# Patient Record
Sex: FEMALE | Race: WHITE | NOT HISPANIC OR LATINO
[De-identification: names, ages, dates, MRNs, and addresses within clinical notes are randomized per-mention and may not be internally consistent; named-entity substitution may affect disease eponyms.]

---

## 2021-02-25 ENCOUNTER — TRANSCRIPTION ENCOUNTER (OUTPATIENT)
Age: 36
End: 2021-02-25

## 2022-09-19 ENCOUNTER — NON-APPOINTMENT (OUTPATIENT)
Age: 37
End: 2022-09-19

## 2023-01-03 ENCOUNTER — APPOINTMENT (OUTPATIENT)
Dept: MATERNAL FETAL MEDICINE | Facility: CLINIC | Age: 38
End: 2023-01-03

## 2023-01-03 ENCOUNTER — APPOINTMENT (OUTPATIENT)
Dept: ANTEPARTUM | Facility: CLINIC | Age: 38
End: 2023-01-03

## 2023-01-09 ENCOUNTER — NON-APPOINTMENT (OUTPATIENT)
Age: 38
End: 2023-01-09

## 2023-01-12 ENCOUNTER — ASOB RESULT (OUTPATIENT)
Age: 38
End: 2023-01-12

## 2023-01-12 ENCOUNTER — APPOINTMENT (OUTPATIENT)
Dept: ANTEPARTUM | Facility: CLINIC | Age: 38
End: 2023-01-12
Payer: COMMERCIAL

## 2023-01-12 ENCOUNTER — APPOINTMENT (OUTPATIENT)
Dept: MATERNAL FETAL MEDICINE | Facility: CLINIC | Age: 38
End: 2023-01-12

## 2023-01-12 PROCEDURE — 76801 OB US < 14 WKS SINGLE FETUS: CPT

## 2023-01-12 PROCEDURE — 76813 OB US NUCHAL MEAS 1 GEST: CPT | Mod: 59

## 2023-01-12 PROCEDURE — 99204 OFFICE O/P NEW MOD 45 MIN: CPT | Mod: 25

## 2023-02-07 ENCOUNTER — NON-APPOINTMENT (OUTPATIENT)
Age: 38
End: 2023-02-07

## 2023-02-28 ENCOUNTER — TRANSCRIPTION ENCOUNTER (OUTPATIENT)
Age: 38
End: 2023-02-28

## 2023-02-28 ENCOUNTER — NON-APPOINTMENT (OUTPATIENT)
Age: 38
End: 2023-02-28

## 2023-03-01 ENCOUNTER — ASOB RESULT (OUTPATIENT)
Age: 38
End: 2023-03-01

## 2023-03-01 ENCOUNTER — APPOINTMENT (OUTPATIENT)
Dept: ANTEPARTUM | Facility: CLINIC | Age: 38
End: 2023-03-01
Payer: COMMERCIAL

## 2023-03-01 PROCEDURE — 76811 OB US DETAILED SNGL FETUS: CPT

## 2023-03-02 ENCOUNTER — APPOINTMENT (OUTPATIENT)
Dept: OTOLARYNGOLOGY | Facility: CLINIC | Age: 38
End: 2023-03-02
Payer: COMMERCIAL

## 2023-03-02 ENCOUNTER — NON-APPOINTMENT (OUTPATIENT)
Age: 38
End: 2023-03-02

## 2023-03-02 VITALS
WEIGHT: 190 LBS | RESPIRATION RATE: 16 BRPM | SYSTOLIC BLOOD PRESSURE: 108 MMHG | OXYGEN SATURATION: 97 % | HEIGHT: 65 IN | HEART RATE: 79 BPM | TEMPERATURE: 98 F | DIASTOLIC BLOOD PRESSURE: 75 MMHG | BODY MASS INDEX: 31.65 KG/M2

## 2023-03-02 DIAGNOSIS — Z78.9 OTHER SPECIFIED HEALTH STATUS: ICD-10-CM

## 2023-03-02 PROCEDURE — 31231 NASAL ENDOSCOPY DX: CPT

## 2023-03-02 PROCEDURE — 92550 TYMPANOMETRY & REFLEX THRESH: CPT | Mod: 52

## 2023-03-02 PROCEDURE — 92557 COMPREHENSIVE HEARING TEST: CPT

## 2023-03-02 PROCEDURE — 99204 OFFICE O/P NEW MOD 45 MIN: CPT | Mod: 25

## 2023-03-02 NOTE — HISTORY OF PRESENT ILLNESS
[de-identified] : 38 y/o F is presenting with b aural fullness and l hl after plane flight for the past 2 weeks. She can clear her right ear, but cannot clear her left ear. She is also c/o nasal congestion and has clear nasal discharge. She has tried several nasal rinses and sleeping on a either side with minimal relief. She had uri when she flew. She denies allergies. She is currently 20 weeks pregnant. She has had a cough and her OB is treating her for this and listened to her lungs. She took a recent zpack but could not tolerate this because of GI upset. Nonsmoker. No fh relevant to cc.

## 2023-03-02 NOTE — DATA REVIEWED
[de-identified] :  showed r nl hearing, l mixed hl- ab gaps, b type c tympanograms (l could be B) -results reviewed with pt

## 2023-03-02 NOTE — REASON FOR VISIT
[Initial Evaluation] : an initial evaluation for [FreeTextEntry2] : b aural fullness l>r, nasal congestion

## 2023-03-02 NOTE — ASSESSMENT
[FreeTextEntry1] : l rashad, b etd after flying with uri while pregnant\par - showed r nl hearing, l mixed hl- ab gaps, b type c tympanograms -results reviewed with pt \par -scope showed b mucoid drainage l>r\par -advised to try to auto insufflate ear TID - she could not in office,and she had been trying before to do this\par -spoke with covering OB who recommended Flonase (over Afrin), zpack (as we explained she needs abx), and to take a probiotic with zpack (PCN allergic)\par -pt has upcoming flight 3/13 --> explained she may need to postpone flight of e.t. still blocked or could consider myringotomy at next visit if no improvement\par RTC in 1 week or sooner for any issues

## 2023-03-02 NOTE — PHYSICAL EXAM
[Midline] : trachea located in midline position [Nasal Endoscopy Performed] : nasal endoscopy was performed, see procedure section for findings [Normal] : no neck adenopathy [de-identified] : r nl- examined under microscope, l rashad- examined under microscope - norris-colored ME fluid [de-identified] : gait steady

## 2023-03-02 NOTE — PROCEDURE
[Anterior rhinoscopy insufficient to account for symptoms] : anterior rhinoscopy insufficient to account for symptoms [Topical Lidocaine] : topical lidocaine [Oxymetazoline HCl] : oxymetazoline HCl [Flexible Endoscope] : examined with the flexible endoscope [Normal] : the paranasal sinuses had no abnormalities [Posterior Lesion] : posterior lesion [Nasal Mucosa] : purulence on the right [Serial Number: ___] : Serial Number: [unfilled] [FreeTextEntry6] : done to r/o sinusitis, check OMU patency  and ro npx mass causing l unilat rashad\par found to have b mucoid drainage from eustachian tubes l>r \par r omu mucoid draiange [FreeTextEntry3] : ets wwith mucoid drainage

## 2023-03-08 ENCOUNTER — ASOB RESULT (OUTPATIENT)
Age: 38
End: 2023-03-08

## 2023-03-08 ENCOUNTER — APPOINTMENT (OUTPATIENT)
Dept: ANTEPARTUM | Facility: CLINIC | Age: 38
End: 2023-03-08
Payer: COMMERCIAL

## 2023-03-08 PROCEDURE — 76825 ECHO EXAM OF FETAL HEART: CPT

## 2023-03-08 PROCEDURE — 76827 ECHO EXAM OF FETAL HEART: CPT

## 2023-03-08 PROCEDURE — 93325 DOPPLER ECHO COLOR FLOW MAPG: CPT

## 2023-03-09 ENCOUNTER — APPOINTMENT (OUTPATIENT)
Dept: OTOLARYNGOLOGY | Facility: CLINIC | Age: 38
End: 2023-03-09
Payer: COMMERCIAL

## 2023-03-09 VITALS
HEIGHT: 65 IN | DIASTOLIC BLOOD PRESSURE: 78 MMHG | OXYGEN SATURATION: 98 % | BODY MASS INDEX: 32.49 KG/M2 | HEART RATE: 91 BPM | SYSTOLIC BLOOD PRESSURE: 116 MMHG | TEMPERATURE: 97.7 F | WEIGHT: 195 LBS

## 2023-03-09 DIAGNOSIS — H65.92 UNSPECIFIED NONSUPPURATIVE OTITIS MEDIA, LEFT EAR: ICD-10-CM

## 2023-03-09 DIAGNOSIS — H90.72 MIXED CONDUCTIVE AND SENSORINEURAL HEARING LOSS, UNILATERAL, LEFT EAR, WITH UNRESTRICTED HEARING ON THE CONTRALATERAL SIDE: ICD-10-CM

## 2023-03-09 DIAGNOSIS — H69.80 OTHER SPECIFIED DISORDERS OF EUSTACHIAN TUBE, UNSPECIFIED EAR: ICD-10-CM

## 2023-03-09 PROCEDURE — 99213 OFFICE O/P EST LOW 20 MIN: CPT

## 2023-03-09 NOTE — HISTORY OF PRESENT ILLNESS
[de-identified] : 1 week followup visit for this 38 y/o F with b aural fullness with uri after a plane flight approximately 4 weeks ago. She is currently 21 weeks pregnant. She took 1 zpack with OB. At last visit she was found to have l rashad, b etd, and l mixed hl. We spoke with covering OB who recommended Flonase and zpack. She feels nasal congestion, and fullness in ears is 85% improved. She can now easily autoinsufflate b ears.

## 2023-03-09 NOTE — ASSESSMENT
[FreeTextEntry1] : l rashad, b etd after flying with uri while pregnant\par -s/p zpack\par -improved\par -pt can insufflate both ears\par -reassured she should be able to fly\par -prior to flight recommended Afrin 30 minutes prior to takeoff but to discuss this with OB first, and [employer]\par RTC as needed

## 2023-03-09 NOTE — REASON FOR VISIT
[Subsequent Evaluation] : a subsequent evaluation for [FreeTextEntry2] : b aural fullness l>r, nasal congestion

## 2023-03-09 NOTE — PHYSICAL EXAM
[Midline] : trachea located in midline position [Normal] : no neck adenopathy [de-identified] : gait steady

## 2023-05-30 ENCOUNTER — APPOINTMENT (OUTPATIENT)
Dept: PULMONOLOGY | Facility: CLINIC | Age: 38
End: 2023-05-30
Payer: COMMERCIAL

## 2023-05-30 ENCOUNTER — RX RENEWAL (OUTPATIENT)
Age: 38
End: 2023-05-30

## 2023-05-30 VITALS
TEMPERATURE: 97 F | HEART RATE: 92 BPM | BODY MASS INDEX: 34.99 KG/M2 | DIASTOLIC BLOOD PRESSURE: 78 MMHG | WEIGHT: 210 LBS | OXYGEN SATURATION: 98 % | HEIGHT: 65 IN | RESPIRATION RATE: 15 BRPM | SYSTOLIC BLOOD PRESSURE: 111 MMHG

## 2023-05-30 DIAGNOSIS — R05.9 COUGH, UNSPECIFIED: ICD-10-CM

## 2023-05-30 PROCEDURE — 99203 OFFICE O/P NEW LOW 30 MIN: CPT

## 2023-05-30 NOTE — ASSESSMENT
[FreeTextEntry1] : Plan:\par \par -Flonase and Pecid for 2 days, monitor for improvement in cough\par -Albuterol ordered, use only if cough does not improve\par -Call to inform if cough has improved\par -Notify office immediately of any S/S of acute respiratory distress \par \par Attending:\par \par Agree with above.  37-year-old woman, 33 weeks pregnant.  Second pregnancy.  No pulmonary history.  No history of asthma.  Uncomplicated first pregnancy.  Non-smoker.  No history of allergies or atopy.  Patient sounds like he has had several viral infections over the past 12 months.  She had one in September before she was pregnant, fevers, cough, viral type symptoms.  Another episode in March, ultimately seen by ENT for the cough and treated with azithromycin.  Afrin was considered but her OB told her not to take it.  That resolved.  Now again for the past 3 weeks runny nose, sinus congestion and cough.  No shortness of breath.  Cough is bothersome.  She also has a history of reflux and GERD with pregnancy and has been taking Tums a lot.  Oxygen saturation is normal.  Her lungs are clear.  She coughs with any deep inspiration through the mouth, improves when read through her nose.  Cough is most consistent at this time with upper airway cough.  Perhaps being exacerbated by some reflux.  Trial of Flonase twice daily.  Pepcid.  If not improved in a couple days, please  the prescription for albuterol inhaler and let me know if that improves.  Call if any symptoms worsen or any shortness of breath develops right away.

## 2023-05-30 NOTE — HISTORY OF PRESENT ILLNESS
[Never] : never [TextBox_4] : Pt is a 36y/o F, 33 weeks pregnant, with history of GERD, who presents today with c/o productive cough for 3 weeks.\par \par Reports initial viral infection 8 months ago with presentation of cough, fever, SOB, and wheezing, symptoms got better after a few days, however cough remains for about 2 months. States she again had viral infection with cough in March, seen by ENT, who prescribed Azithromycin, symptoms got better, however cough again presented within the last 3 weeks. States cough tend to be worse in morning, when talking, or laughing. Does have a 3 year old son, who attends .\par \par Denies SOB, wheezing, fever, chest tightness, chest pressure, hoarseness, change in voice, or rhinorrhea. No history of childhood Asthma or frequent URI. Although she does report using an inhaler as a child, however has not been given a diagnosis of Asthma.  Thought she was experiencing seasonal allergies, has used Claritin and nasal spray daily with minimal relief.  Reports she uses Tums daily due to acid reflux. \par \par No cigarette use, no occupational or hazardous behavior. \par \par \par

## 2023-07-23 ENCOUNTER — INPATIENT (INPATIENT)
Facility: HOSPITAL | Age: 38
LOS: 1 days | Discharge: HOME | End: 2023-07-25
Attending: OBSTETRICS & GYNECOLOGY | Admitting: OBSTETRICS & GYNECOLOGY
Payer: COMMERCIAL

## 2023-07-23 ENCOUNTER — TRANSCRIPTION ENCOUNTER (OUTPATIENT)
Age: 38
End: 2023-07-23

## 2023-07-23 VITALS — SYSTOLIC BLOOD PRESSURE: 129 MMHG | HEART RATE: 94 BPM | DIASTOLIC BLOOD PRESSURE: 74 MMHG

## 2023-07-23 RX ORDER — SODIUM CHLORIDE 9 MG/ML
1000 INJECTION, SOLUTION INTRAVENOUS
Refills: 0 | Status: DISCONTINUED | OUTPATIENT
Start: 2023-07-23 | End: 2023-07-24

## 2023-07-23 RX ORDER — OXYTOCIN 10 UNIT/ML
333.33 VIAL (ML) INJECTION
Qty: 20 | Refills: 0 | Status: DISCONTINUED | OUTPATIENT
Start: 2023-07-23 | End: 2023-07-25

## 2023-07-23 RX ORDER — CITRIC ACID/SODIUM CITRATE 300-500 MG
15 SOLUTION, ORAL ORAL EVERY 6 HOURS
Refills: 0 | Status: DISCONTINUED | OUTPATIENT
Start: 2023-07-23 | End: 2023-07-24

## 2023-07-23 RX ORDER — CHLORHEXIDINE GLUCONATE 213 G/1000ML
1 SOLUTION TOPICAL DAILY
Refills: 0 | Status: DISCONTINUED | OUTPATIENT
Start: 2023-07-23 | End: 2023-07-24

## 2023-07-23 RX ADMIN — SODIUM CHLORIDE 125 MILLILITER(S): 9 INJECTION, SOLUTION INTRAVENOUS at 23:42

## 2023-07-23 NOTE — OB PROVIDER H&P - ASSESSMENT
A/P: 38yo  @ 40w3d here for elective IOL  - Admit to L&D  - Routine labs  - EFM/TOCO: resuscitative measures prn  - Buccal cytotec x 1  - Place cervical arvizu balloon at midnight  - Augment with Pitocin after 1 dose of buccal cytotec  - Anesthesia consult for epidural prn  - Expect     d/w , Tori Ontiveros PA-C

## 2023-07-23 NOTE — OB PROVIDER H&P - NSLOWPPHRISK_OBGYN_A_OB
No previous uterine incision/Michel Pregnancy/Less than or equal to 4 previous vaginal births/No known bleeding disorder/No history of postpartum hemorrhage/No other PPH risks indicated

## 2023-07-23 NOTE — OB RN PATIENT PROFILE - FALL HARM RISK - UNIVERSAL INTERVENTIONS
Bed in lowest position, wheels locked, appropriate side rails in place/Call bell, personal items and telephone in reach/Instruct patient to call for assistance before getting out of bed or chair/Non-slip footwear when patient is out of bed/[place] to call system/Physically safe environment - no spills, clutter or unnecessary equipment/Purposeful Proactive Rounding/Room/bathroom lighting operational, light cord in reach

## 2023-07-23 NOTE — OB PROVIDER H&P - NSHPPHYSICALEXAM_GEN_ALL_CORE
PE:    T(C): 36.5 (07-23-23 @ 22:59), Max: 36.5 (07-23-23 @ 22:46)  HR: 103 (07-23-23 @ 23:11) (89 - 103)  BP: 129/74 (07-23-23 @ 22:59) (129/74 - 129/74)  RR: 16 (07-23-23 @ 22:59) (16 - 16)  SpO2: 96% (07-23-23 @ 23:11) (93% - 99%)    General: NAD, A&Ox3  CV: RRR  Lungs: Clear bilat   Abd: soft, nontender, gravid  VE: 1/70/-3  Bedside sono: vertex  EFM: 135/moderate variablity/+accels/-decels  TOCO: none

## 2023-07-24 ENCOUNTER — TRANSCRIPTION ENCOUNTER (OUTPATIENT)
Age: 38
End: 2023-07-24

## 2023-07-24 LAB
ALBUMIN SERPL ELPH-MCNC: 3.3 G/DL — SIGNIFICANT CHANGE UP (ref 3.3–5)
ALP SERPL-CCNC: 119 U/L — SIGNIFICANT CHANGE UP (ref 40–120)
ALT FLD-CCNC: 8 U/L — LOW (ref 10–45)
ANION GAP SERPL CALC-SCNC: 12 MMOL/L — SIGNIFICANT CHANGE UP (ref 5–17)
APTT BLD: 24.6 SEC — LOW (ref 27.5–35.5)
AST SERPL-CCNC: 16 U/L — SIGNIFICANT CHANGE UP (ref 10–40)
BASOPHILS # BLD AUTO: 0 K/UL — SIGNIFICANT CHANGE UP (ref 0–0.2)
BASOPHILS # BLD AUTO: 0.05 K/UL — SIGNIFICANT CHANGE UP (ref 0–0.2)
BASOPHILS NFR BLD AUTO: 0 % — SIGNIFICANT CHANGE UP (ref 0–2)
BASOPHILS NFR BLD AUTO: 0.4 % — SIGNIFICANT CHANGE UP (ref 0–2)
BILIRUB SERPL-MCNC: 0.7 MG/DL — SIGNIFICANT CHANGE UP (ref 0.2–1.2)
BUN SERPL-MCNC: 6 MG/DL — LOW (ref 7–23)
CALCIUM SERPL-MCNC: 8.9 MG/DL — SIGNIFICANT CHANGE UP (ref 8.4–10.5)
CHLORIDE SERPL-SCNC: 103 MMOL/L — SIGNIFICANT CHANGE UP (ref 96–108)
CO2 SERPL-SCNC: 18 MMOL/L — LOW (ref 22–31)
COVID-19 SPIKE DOMAIN AB INTERP: POSITIVE
COVID-19 SPIKE DOMAIN ANTIBODY RESULT: >250 U/ML — HIGH
CREAT SERPL-MCNC: 0.52 MG/DL — SIGNIFICANT CHANGE UP (ref 0.5–1.3)
EGFR: 123 ML/MIN/1.73M2 — SIGNIFICANT CHANGE UP
EOSINOPHIL # BLD AUTO: 0.21 K/UL — SIGNIFICANT CHANGE UP (ref 0–0.5)
EOSINOPHIL # BLD AUTO: 0.31 K/UL — SIGNIFICANT CHANGE UP (ref 0–0.5)
EOSINOPHIL NFR BLD AUTO: 0.9 % — SIGNIFICANT CHANGE UP (ref 0–6)
EOSINOPHIL NFR BLD AUTO: 2.3 % — SIGNIFICANT CHANGE UP (ref 0–6)
FIBRINOGEN PPP-MCNC: 411 MG/DL — SIGNIFICANT CHANGE UP (ref 200–445)
GLUCOSE SERPL-MCNC: 89 MG/DL — SIGNIFICANT CHANGE UP (ref 70–99)
HCT VFR BLD CALC: 34.8 % — SIGNIFICANT CHANGE UP (ref 34.5–45)
HCT VFR BLD CALC: 35 % — SIGNIFICANT CHANGE UP (ref 34.5–45)
HGB BLD-MCNC: 11.9 G/DL — SIGNIFICANT CHANGE UP (ref 11.5–15.5)
HGB BLD-MCNC: 12 G/DL — SIGNIFICANT CHANGE UP (ref 11.5–15.5)
IMM GRANULOCYTES NFR BLD AUTO: 1.2 % — HIGH (ref 0–0.9)
INR BLD: 0.92 RATIO — SIGNIFICANT CHANGE UP (ref 0.88–1.16)
LDH SERPL L TO P-CCNC: 230 U/L — SIGNIFICANT CHANGE UP (ref 50–242)
LYMPHOCYTES # BLD AUTO: 1.8 K/UL — SIGNIFICANT CHANGE UP (ref 1–3.3)
LYMPHOCYTES # BLD AUTO: 28 % — SIGNIFICANT CHANGE UP (ref 13–44)
LYMPHOCYTES # BLD AUTO: 3.69 K/UL — HIGH (ref 1–3.3)
LYMPHOCYTES # BLD AUTO: 7.8 % — LOW (ref 13–44)
MANUAL SMEAR VERIFICATION: SIGNIFICANT CHANGE UP
MCHC RBC-ENTMCNC: 30.9 PG — SIGNIFICANT CHANGE UP (ref 27–34)
MCHC RBC-ENTMCNC: 30.9 PG — SIGNIFICANT CHANGE UP (ref 27–34)
MCHC RBC-ENTMCNC: 34.2 GM/DL — SIGNIFICANT CHANGE UP (ref 32–36)
MCHC RBC-ENTMCNC: 34.3 GM/DL — SIGNIFICANT CHANGE UP (ref 32–36)
MCV RBC AUTO: 90.2 FL — SIGNIFICANT CHANGE UP (ref 80–100)
MCV RBC AUTO: 90.4 FL — SIGNIFICANT CHANGE UP (ref 80–100)
MONOCYTES # BLD AUTO: 0.39 K/UL — SIGNIFICANT CHANGE UP (ref 0–0.9)
MONOCYTES # BLD AUTO: 0.89 K/UL — SIGNIFICANT CHANGE UP (ref 0–0.9)
MONOCYTES NFR BLD AUTO: 1.7 % — LOW (ref 2–14)
MONOCYTES NFR BLD AUTO: 6.7 % — SIGNIFICANT CHANGE UP (ref 2–14)
NEUTROPHILS # BLD AUTO: 20.64 K/UL — HIGH (ref 1.8–7.4)
NEUTROPHILS # BLD AUTO: 8.1 K/UL — HIGH (ref 1.8–7.4)
NEUTROPHILS NFR BLD AUTO: 61.4 % — SIGNIFICANT CHANGE UP (ref 43–77)
NEUTROPHILS NFR BLD AUTO: 87 % — HIGH (ref 43–77)
NEUTS BAND # BLD: 2.6 % — SIGNIFICANT CHANGE UP (ref 0–8)
NRBC # BLD: 0 /100 WBCS — SIGNIFICANT CHANGE UP (ref 0–0)
PLAT MORPH BLD: NORMAL — SIGNIFICANT CHANGE UP
PLATELET # BLD AUTO: 168 K/UL — SIGNIFICANT CHANGE UP (ref 150–400)
PLATELET # BLD AUTO: 211 K/UL — SIGNIFICANT CHANGE UP (ref 150–400)
POTASSIUM SERPL-MCNC: 3.8 MMOL/L — SIGNIFICANT CHANGE UP (ref 3.5–5.3)
POTASSIUM SERPL-SCNC: 3.8 MMOL/L — SIGNIFICANT CHANGE UP (ref 3.5–5.3)
PROT SERPL-MCNC: 5.4 G/DL — LOW (ref 6–8.3)
PROTHROM AB SERPL-ACNC: 10.6 SEC — SIGNIFICANT CHANGE UP (ref 10.5–13.4)
RBC # BLD: 3.85 M/UL — SIGNIFICANT CHANGE UP (ref 3.8–5.2)
RBC # BLD: 3.88 M/UL — SIGNIFICANT CHANGE UP (ref 3.8–5.2)
RBC # FLD: 13.2 % — SIGNIFICANT CHANGE UP (ref 10.3–14.5)
RBC # FLD: 13.2 % — SIGNIFICANT CHANGE UP (ref 10.3–14.5)
RBC BLD AUTO: SIGNIFICANT CHANGE UP
SARS-COV-2 IGG+IGM SERPL QL IA: >250 U/ML — HIGH
SARS-COV-2 IGG+IGM SERPL QL IA: POSITIVE
SODIUM SERPL-SCNC: 133 MMOL/L — LOW (ref 135–145)
T PALLIDUM AB TITR SER: NEGATIVE — SIGNIFICANT CHANGE UP
URATE SERPL-MCNC: 4.2 MG/DL — SIGNIFICANT CHANGE UP (ref 2.5–7)
WBC # BLD: 13.2 K/UL — HIGH (ref 3.8–10.5)
WBC # BLD: 23.04 K/UL — HIGH (ref 3.8–10.5)
WBC # FLD AUTO: 13.2 K/UL — HIGH (ref 3.8–10.5)
WBC # FLD AUTO: 23.04 K/UL — HIGH (ref 3.8–10.5)

## 2023-07-24 RX ORDER — OXYTOCIN 10 UNIT/ML
4 VIAL (ML) INJECTION
Qty: 30 | Refills: 0 | Status: DISCONTINUED | OUTPATIENT
Start: 2023-07-24 | End: 2023-07-24

## 2023-07-24 RX ORDER — HYDROCORTISONE 1 %
1 OINTMENT (GRAM) TOPICAL EVERY 6 HOURS
Refills: 0 | Status: DISCONTINUED | OUTPATIENT
Start: 2023-07-24 | End: 2023-07-25

## 2023-07-24 RX ORDER — BENZOCAINE 10 %
1 GEL (GRAM) MUCOUS MEMBRANE EVERY 6 HOURS
Refills: 0 | Status: DISCONTINUED | OUTPATIENT
Start: 2023-07-24 | End: 2023-07-25

## 2023-07-24 RX ORDER — KETOROLAC TROMETHAMINE 30 MG/ML
30 SYRINGE (ML) INJECTION ONCE
Refills: 0 | Status: DISCONTINUED | OUTPATIENT
Start: 2023-07-24 | End: 2023-07-24

## 2023-07-24 RX ORDER — DIPHENHYDRAMINE HCL 50 MG
25 CAPSULE ORAL EVERY 6 HOURS
Refills: 0 | Status: DISCONTINUED | OUTPATIENT
Start: 2023-07-24 | End: 2023-07-25

## 2023-07-24 RX ORDER — LANOLIN
1 OINTMENT (GRAM) TOPICAL EVERY 6 HOURS
Refills: 0 | Status: DISCONTINUED | OUTPATIENT
Start: 2023-07-24 | End: 2023-07-25

## 2023-07-24 RX ORDER — PRAMOXINE HYDROCHLORIDE 150 MG/15G
1 AEROSOL, FOAM RECTAL EVERY 4 HOURS
Refills: 0 | Status: DISCONTINUED | OUTPATIENT
Start: 2023-07-24 | End: 2023-07-25

## 2023-07-24 RX ORDER — TETANUS TOXOID, REDUCED DIPHTHERIA TOXOID AND ACELLULAR PERTUSSIS VACCINE, ADSORBED 5; 2.5; 8; 8; 2.5 [IU]/.5ML; [IU]/.5ML; UG/.5ML; UG/.5ML; UG/.5ML
0.5 SUSPENSION INTRAMUSCULAR ONCE
Refills: 0 | Status: DISCONTINUED | OUTPATIENT
Start: 2023-07-24 | End: 2023-07-25

## 2023-07-24 RX ORDER — SODIUM CHLORIDE 9 MG/ML
500 INJECTION, SOLUTION INTRAVENOUS ONCE
Refills: 0 | Status: COMPLETED | OUTPATIENT
Start: 2023-07-24 | End: 2023-07-24

## 2023-07-24 RX ORDER — SODIUM CHLORIDE 9 MG/ML
3 INJECTION INTRAMUSCULAR; INTRAVENOUS; SUBCUTANEOUS EVERY 8 HOURS
Refills: 0 | Status: DISCONTINUED | OUTPATIENT
Start: 2023-07-24 | End: 2023-07-25

## 2023-07-24 RX ORDER — OXYTOCIN 10 UNIT/ML
41.67 VIAL (ML) INJECTION
Qty: 20 | Refills: 0 | Status: DISCONTINUED | OUTPATIENT
Start: 2023-07-24 | End: 2023-07-25

## 2023-07-24 RX ORDER — DIBUCAINE 1 %
1 OINTMENT (GRAM) RECTAL EVERY 6 HOURS
Refills: 0 | Status: DISCONTINUED | OUTPATIENT
Start: 2023-07-24 | End: 2023-07-25

## 2023-07-24 RX ORDER — ACETAMINOPHEN 500 MG
975 TABLET ORAL
Refills: 0 | Status: DISCONTINUED | OUTPATIENT
Start: 2023-07-24 | End: 2023-07-25

## 2023-07-24 RX ORDER — SIMETHICONE 80 MG/1
80 TABLET, CHEWABLE ORAL EVERY 4 HOURS
Refills: 0 | Status: DISCONTINUED | OUTPATIENT
Start: 2023-07-24 | End: 2023-07-25

## 2023-07-24 RX ORDER — OXYCODONE HYDROCHLORIDE 5 MG/1
5 TABLET ORAL ONCE
Refills: 0 | Status: DISCONTINUED | OUTPATIENT
Start: 2023-07-24 | End: 2023-07-25

## 2023-07-24 RX ORDER — MAGNESIUM HYDROXIDE 400 MG/1
30 TABLET, CHEWABLE ORAL
Refills: 0 | Status: DISCONTINUED | OUTPATIENT
Start: 2023-07-24 | End: 2023-07-25

## 2023-07-24 RX ORDER — AER TRAVELER 0.5 G/1
1 SOLUTION RECTAL; TOPICAL EVERY 4 HOURS
Refills: 0 | Status: DISCONTINUED | OUTPATIENT
Start: 2023-07-24 | End: 2023-07-25

## 2023-07-24 RX ORDER — IBUPROFEN 200 MG
600 TABLET ORAL EVERY 6 HOURS
Refills: 0 | Status: COMPLETED | OUTPATIENT
Start: 2023-07-24 | End: 2024-06-21

## 2023-07-24 RX ORDER — OXYCODONE HYDROCHLORIDE 5 MG/1
5 TABLET ORAL
Refills: 0 | Status: DISCONTINUED | OUTPATIENT
Start: 2023-07-24 | End: 2023-07-25

## 2023-07-24 RX ORDER — IBUPROFEN 200 MG
600 TABLET ORAL EVERY 6 HOURS
Refills: 0 | Status: DISCONTINUED | OUTPATIENT
Start: 2023-07-24 | End: 2023-07-25

## 2023-07-24 RX ADMIN — Medication 125 MILLIUNIT(S)/MIN: at 15:24

## 2023-07-24 RX ADMIN — Medication 600 MILLIGRAM(S): at 21:00

## 2023-07-24 RX ADMIN — Medication 1 TABLET(S): at 21:00

## 2023-07-24 RX ADMIN — Medication 4 MILLIUNIT(S)/MIN: at 03:11

## 2023-07-24 RX ADMIN — Medication 975 MILLIGRAM(S): at 19:10

## 2023-07-24 RX ADMIN — Medication 30 MILLIGRAM(S): at 15:38

## 2023-07-24 RX ADMIN — Medication 600 MILLIGRAM(S): at 21:30

## 2023-07-24 RX ADMIN — SODIUM CHLORIDE 1000 MILLILITER(S): 9 INJECTION, SOLUTION INTRAVENOUS at 03:55

## 2023-07-24 RX ADMIN — Medication 975 MILLIGRAM(S): at 18:40

## 2023-07-24 NOTE — OB NEONATOLOGY/PEDIATRICIAN DELIVERY SUMMARY - NSPEDSNEONOTESA_OBGYN_ALL_OB_FT
Requested by OB to attend a  vac assist, 40 4/7 wks. Mother is a 38 yo,  female with negative prenatal labs, GBS negative and blood type B+. Uncomplicated pregnancy. Induction of labor for post dates. Vac assist with one pop off. Infant cried and stimulated. Moderate- large amt of bloody secretions obtained with deep suction. Apgars 8 and 9. Molding due to vacuum.

## 2023-07-24 NOTE — DISCHARGE NOTE OB - CARE PLAN
1 Principal Discharge DX:	Vaginal delivery  Assessment and plan of treatment:	PPV 6 WEEKS   Principal Discharge DX:	Vaginal delivery  Assessment and plan of treatment:	PPV 6 WEEKS. please call if you have fever, severe pain, heavy bleeding.

## 2023-07-24 NOTE — OB PROVIDER LABOR PROGRESS NOTE - NS_SUBJECTIVE/OBJECTIVE_OBGYN_ALL_OB_FT
PT S/P BC , CF, EPI. FEELS COMFORTABLE. NOW ON PITOCIN
Pt seen for placement of cervical arvizu balloon with sterile techniques; 60cc NS instilled into uterine/vaginal balloons; placed w/o incidence; pt tolerated procedure well
PT C/O RECTAL PRESSURE. WAS EXAMINED AT 1058 & WAS 4/90/-2 AT THAT TIME. ANESTHESIA CALLED FOR TOP OFF. NOW FELT MORE PRESSURE

## 2023-07-24 NOTE — OB RN DELIVERY SUMMARY - NSSELHIDDEN_OBGYN_ALL_OB_FT
[NS_DeliveryAttending1_OBGYN_ALL_OB_FT:[id number]==],[NS_DeliveryRN_OBGYN_ALL_OB_FT:[id number]==] [NS_DeliveryAttending1_OBGYN_ALL_OB_FT:[id number]==],[NS_DeliveryRN_OBGYN_ALL_OB_FT:[id number]==],[NS_DeliveryAssist1_OBGYN_ALL_OB_FT:[id number]=]

## 2023-07-24 NOTE — DISCHARGE NOTE OB - PATIENT PORTAL LINK FT
You can access the FollowMyHealth Patient Portal offered by [facility] by registering at the following website: [email or web address]. By joining [employer]’s FollowMyHealth portal, you will also be able to view your health information using other applications (apps) compatible with our system.

## 2023-07-24 NOTE — OB PROVIDER DELIVERY SUMMARY - NSSELHIDDEN_OBGYN_ALL_OB_FT
[NS_DeliveryAttending1_OBGYN_ALL_OB_FT:[id number]==] [NS_DeliveryAttending1_OBGYN_ALL_OB_FT:[id number]==],[NS_DeliveryAssist1_OBGYN_ALL_OB_FT:[id number]=]

## 2023-07-24 NOTE — DISCHARGE NOTE OB - HOSPITAL COURSE
IOL 40+ WEEKS, RECEIVED BC, CF, PITOCIN, EPI, AROM. HAD VAVD FEMALE FOR NRFHRT. HAD PARTIAL 3RD DEGREE LACERATION. D/C PPD # IOL 40+ WEEKS, RECEIVED BC, CF, PITOCIN, EPI, AROM. HAD VAVD FEMALE FOR NRFHRT. HAD PARTIAL 3RD DEGREE LACERATION. D/C PPD #1

## 2023-07-24 NOTE — OB PROVIDER LABOR PROGRESS NOTE - ASSESSMENT
A/P:  -EFM: resuscitative measures prn  -augment with pitocin at 245a  -anesthesia consult for epidural prn  -anticipate   
ALLOW TO LABOR DOWN. MATERNAL POSITION CHANGES    J CAFARO
CF REMOVED. AROM- CLEAR FLUID  EFW 8 lB ON 7/13. PITOCIN RUNNING. AWAIT ACTIVE LABOR. CONSENTS SIGNED    ADALBERTO HURD

## 2023-07-24 NOTE — OB PROVIDER DELIVERY SUMMARY - NSPROVIDERDELIVERYNOTE_OBGYN_ALL_OB_FT
WHEN PT WAS FD VTS WAS INITIALLY ROP. WITH PUSHING & MANUAL ROTATION FETUS CONVERTED TO ROBBI. VTX DESCENDED DOWN TO +3. FHR TRACING PERSISTENT CATEGORY 2 DESPITE CHANGE IN MATERNAL POSITION & RETING DURING A FEW CTXS. AS A RESULT WITH PARENT'S PERMISSION, OUTLET VAVD OF 8-9 FEMALE, APGAR 8/9.  SUCTIONED AT DELIVERY & CRIED SPONT, GIVEN TO AWAITING PEDS. PLACENTA DELIVERED. PT SUFFERRED PARTIAL 3RD DEGREE PERINEAL LACERATION WHICH WAS REPAIRED WITH 2-0 & 3-0 VICRYL.  BONDING WITH PARENTS  ADALBERTO HURD WHEN PT WAS FD VTS WAS INITIALLY ROP. WITH PUSHING & MANUAL ROTATION FETUS CONVERTED TO ROBBI. VTX DESCENDED DOWN TO +3. FHR TRACING PERSISTENT CATEGORY 2 DESPITE CHANGE IN MATERNAL POSITION & RETING DURING A FEW CTXS. AS A RESULT WITH PARENT'S PERMISSION, OUTLET VAVD OF 8-9 FEMALE, APGAR 8/9.  SUCTIONED AT DELIVERY & CRIED SPONT, GIVEN TO AWAITING PEDS. PLACENTA DELIVERED. PT SUFFERED PARTIAL 3RD DEGREE PERINEAL LACERATION WHICH WAS REPAIRED WITH 2-0 & 3-0 VICRYL.  BONDING WITH PARENTS  ADALBERTO HURD

## 2023-07-24 NOTE — OB RN DELIVERY SUMMARY - NS_SEPSISRSKCALC_OBGYN_ALL_OB_FT
EOS calculated successfully. EOS Risk Factor: 0.5/1000 live births ([facility] national incidence); GA=40w4d; Temp=99.32; ROM=6.817; GBS='Negative'; Antibiotics='No antibiotics or any antibiotics < 2 hrs prior to birth'

## 2023-07-24 NOTE — DISCHARGE NOTE OB - MEDICATION SUMMARY - MEDICATIONS TO TAKE
I will START or STAY ON the medications listed below when I get home from the hospital:    ibuprofen 600 mg oral tablet  -- 1 tab(s) by mouth every 6 hours  -- Indication: For pain    acetaminophen 325 mg oral tablet  -- 3 tab(s) by mouth every 6 hours as needed for  moderate pain  -- Indication: For pain

## 2023-07-25 VITALS
HEART RATE: 98 BPM | DIASTOLIC BLOOD PRESSURE: 70 MMHG | SYSTOLIC BLOOD PRESSURE: 121 MMHG | OXYGEN SATURATION: 99 % | RESPIRATION RATE: 18 BRPM | TEMPERATURE: 98 F

## 2023-07-25 DIAGNOSIS — Z37.9 OUTCOME OF DELIVERY, UNSPECIFIED: ICD-10-CM

## 2023-07-25 PROCEDURE — 85384 FIBRINOGEN ACTIVITY: CPT

## 2023-07-25 PROCEDURE — 85025 COMPLETE CBC W/AUTO DIFF WBC: CPT

## 2023-07-25 PROCEDURE — 86780 TREPONEMA PALLIDUM: CPT

## 2023-07-25 PROCEDURE — 80053 COMPREHEN METABOLIC PANEL: CPT

## 2023-07-25 PROCEDURE — 36415 COLL VENOUS BLD VENIPUNCTURE: CPT

## 2023-07-25 PROCEDURE — 86769 SARS-COV-2 COVID-19 ANTIBODY: CPT

## 2023-07-25 PROCEDURE — 59050 FETAL MONITOR W/REPORT: CPT

## 2023-07-25 PROCEDURE — 86850 RBC ANTIBODY SCREEN: CPT

## 2023-07-25 PROCEDURE — 85730 THROMBOPLASTIN TIME PARTIAL: CPT

## 2023-07-25 PROCEDURE — 85610 PROTHROMBIN TIME: CPT

## 2023-07-25 PROCEDURE — 83615 LACTATE (LD) (LDH) ENZYME: CPT

## 2023-07-25 PROCEDURE — 86900 BLOOD TYPING SEROLOGIC ABO: CPT

## 2023-07-25 PROCEDURE — 84550 ASSAY OF BLOOD/URIC ACID: CPT

## 2023-07-25 PROCEDURE — 86901 BLOOD TYPING SEROLOGIC RH(D): CPT

## 2023-07-25 RX ORDER — IBUPROFEN 200 MG
1 TABLET ORAL
Qty: 0 | Refills: 0 | DISCHARGE
Start: 2023-07-25

## 2023-07-25 RX ORDER — ACETAMINOPHEN 500 MG
3 TABLET ORAL
Qty: 0 | Refills: 0 | DISCHARGE
Start: 2023-07-25

## 2023-07-25 RX ADMIN — Medication 600 MILLIGRAM(S): at 09:02

## 2023-07-25 RX ADMIN — Medication 975 MILLIGRAM(S): at 12:32

## 2023-07-25 RX ADMIN — Medication 1 TABLET(S): at 12:29

## 2023-07-25 RX ADMIN — Medication 600 MILLIGRAM(S): at 03:01

## 2023-07-25 RX ADMIN — Medication 975 MILLIGRAM(S): at 06:19

## 2023-07-25 RX ADMIN — Medication 975 MILLIGRAM(S): at 13:32

## 2023-07-25 RX ADMIN — Medication 600 MILLIGRAM(S): at 14:39

## 2023-07-25 RX ADMIN — Medication 975 MILLIGRAM(S): at 06:50

## 2023-07-25 RX ADMIN — Medication 600 MILLIGRAM(S): at 15:39

## 2023-07-25 RX ADMIN — Medication 600 MILLIGRAM(S): at 03:30

## 2023-07-25 RX ADMIN — Medication 975 MILLIGRAM(S): at 00:18

## 2023-07-25 RX ADMIN — Medication 975 MILLIGRAM(S): at 00:50

## 2023-07-25 RX ADMIN — Medication 600 MILLIGRAM(S): at 10:02

## 2023-07-25 NOTE — PROGRESS NOTE ADULT - SUBJECTIVE AND OBJECTIVE BOX
Postpartum Note- PPD#1    Prenatal Labs  Blood type: B Positive  Rubella IgG: immune  RPR: Negative    S:Patient is c/o 4/10 abd cramping and vaginal bleeding (changing her pad q4h).   Pain is being controlled with motrin and tylenol. Denies back pain, fever, h/a sob or chest pain.   Pt is OOB, tolerating PO, voiding. Lochia WNL.     O:  Vital Signs Last 24 Hrs  T(C): 36.8 (2023 05:25), Max: 37.6 (2023 15:03)  T(F): 98.3 (2023 05:25), Max: 99.7 (2023 15:03)  HR: 89 (2023 05:25) (77 - 155)  BP: 100/66 (2023 05:25) (94/61 - 163/75)  RR: 18 (2023 05:25) (18 - 20)  SpO2: 98% (2023 05:25) (76% - 100%)    Parameters below as of 2023 05:25  Patient On (Oxygen Delivery Method): room air         Gen: NAD  Abdomen: Soft, nontender, non-distended, fundus firm.  Vaginal: Lochia WNL    Ext:  Neg calf tenderness    LABS:    Hemoglobin: 12.0 g/dL ( @ 18:00)  Hemoglobin: 11.9 g/dL ( @ 23:58)      Hematocrit: 35.0 % ( @ 18:00)  Hematocrit: 34.8 % ( @ 23:58)      A/P:  37y  PPD # 1   S/P VAVD with 3rd degree lac  Doing Well        #Postpartum  Increase OOB  Regular diet  PO Pain protocol  Routine Postpartum Care      AURELIANO TalamantesS     Postpartum Note- PPD#1    Prenatal Labs  Blood type: B Positive  Rubella IgG: immune  RPR: Negative    S:Patient is c/o 4/10 abd cramping and vaginal bleeding (changing her pad q4h).   Pain is being controlled with motrin and tylenol. Denies back pain, fever, h/a sob or chest pain.   Pt is OOB, tolerating PO, voiding. Lochia WNL.     O:  Vital Signs Last 24 Hrs  T(C): 36.8 (2023 05:25), Max: 37.6 (2023 15:03)  T(F): 98.3 (2023 05:25), Max: 99.7 (2023 15:03)  HR: 89 (2023 05:25) (77 - 155)  BP: 100/66 (2023 05:25) (94/61 - 163/75)  RR: 18 (2023 05:25) (18 - 20)  SpO2: 98% (2023 05:25) (76% - 100%)    Parameters below as of 2023 05:25  Patient On (Oxygen Delivery Method): room air         Gen: NAD  Abdomen: Soft, nontender, non-distended, fundus firm.  Vaginal: Lochia WNL    Ext:  Neg calf tenderness    LABS:    Hemoglobin: 12.0 g/dL ( @ 18:00)  Hemoglobin: 11.9 g/dL ( @ 23:58)      Hematocrit: 35.0 % ( @ 18:00)  Hematocrit: 34.8 % ( @ 23:58)      A/P:  37y  PPD # 1   S/P VAVD with 3rd degree lac  Doing Well        #Postpartum  Increase OOB  Regular diet  PO Pain protocol  Routine Postpartum Care      VIGNESH Talamantes  --------------------------  PA Note:  Agree with above note. In short, pt is a P2 PPD#1 s/p VAVD with gHTN. Patient doing well. BP's WNL and pt asx. Will continue routine post partum care.  ZAIDA Burgos PA-C     Postpartum Note- PPD#1    Prenatal Labs  Blood type: B Positive  Rubella IgG: immune  RPR: Negative    S:Patient is c/o 4/10 abd cramping and vaginal bleeding (changing her pad q4h).   Pain is being controlled with motrin and tylenol. Denies back pain, fever, h/a sob or chest pain.   Pt is OOB, tolerating PO, voiding. Lochia WNL.     O:  Vital Signs Last 24 Hrs  T(C): 36.8 (2023 05:25), Max: 37.6 (2023 15:03)  T(F): 98.3 (2023 05:25), Max: 99.7 (2023 15:03)  HR: 89 (2023 05:25) (77 - 155)  BP: 100/66 (2023 05:25) (94/61 - 163/75)  RR: 18 (2023 05:25) (18 - 20)  SpO2: 98% (2023 05:25) (76% - 100%)    Parameters below as of 2023 05:25  Patient On (Oxygen Delivery Method): room air         Gen: NAD  Abdomen: Soft, nontender, non-distended, fundus firm.  Vaginal: Lochia WNL    Ext:  Neg calf tenderness    LABS:    Hemoglobin: 12.0 g/dL ( @ 18:00)  Hemoglobin: 11.9 g/dL ( @ 23:58)      Hematocrit: 35.0 % ( @ 18:00)  Hematocrit: 34.8 % ( @ 23:58)      A/P:  37y  PPD # 1   S/P VAVD with 3rd degree lac  Doing Well        #Postpartum  Increase OOB  Regular diet  PO Pain protocol  Routine Postpartum Care      VIGNESH Talamantes  --------------------------  PA Note:  Agree with above note. In short, pt is a P2 PPD#1 s/p VAVD with gHTN. Patient doing well. BP's and labs WNL and pt asx. Will continue routine post partum care.  ZAIDA Burgos PA-C

## 2023-07-25 NOTE — PROGRESS NOTE ADULT - SUBJECTIVE AND OBJECTIVE BOX
OB Attending Note    S: Patient doing well. Minimal lochia. Pain controlled.    O: Vital Signs Last 24 Hrs  T(C): 36.8 (2023 05:25), Max: 37.6 (2023 15:03)  T(F): 98.3 (2023 05:25), Max: 99.7 (2023 15:03)  HR: 89 (2023 05:25) (77 - 155)  BP: 100/66 (2023 05:25) (94/61 - 163/75)  BP(mean): --  RR: 18 (2023 05:25) (18 - 20)  SpO2: 98% (2023 05:25) (76% - 100%)    Parameters below as of 2023 05:25  Patient On (Oxygen Delivery Method): room air        Gen: NAD  Abd: soft, NT, ND, fundus firm below umbilicus  Lochia: min  Perineum healing well  Ext: no tenderness    Labs:                        12.0   23.04 )-----------( 168      ( 2023 18:00 )             35.0       A: 37y PPD#1 s/p  doing well.    Plan: cont PP care  OOB/ambulation  Pain control but has more pain than first delivery  discharge home. discharge instructions given. FU in 4-6 weeks    Marlyn Horton DO

## 2023-08-18 PROBLEM — Z78.9 OTHER SPECIFIED HEALTH STATUS: Chronic | Status: ACTIVE | Noted: 2023-07-23

## 2023-08-21 ENCOUNTER — APPOINTMENT (OUTPATIENT)
Dept: COLORECTAL SURGERY | Facility: CLINIC | Age: 38
End: 2023-08-21
Payer: COMMERCIAL

## 2023-08-21 VITALS
TEMPERATURE: 98 F | RESPIRATION RATE: 16 BRPM | OXYGEN SATURATION: 100 % | HEART RATE: 76 BPM | HEIGHT: 65 IN | DIASTOLIC BLOOD PRESSURE: 88 MMHG | BODY MASS INDEX: 33.32 KG/M2 | SYSTOLIC BLOOD PRESSURE: 126 MMHG | WEIGHT: 200 LBS

## 2023-08-21 DIAGNOSIS — Z78.9 OTHER SPECIFIED HEALTH STATUS: ICD-10-CM

## 2023-08-21 DIAGNOSIS — K64.5 PERIANAL VENOUS THROMBOSIS: ICD-10-CM

## 2023-08-21 PROCEDURE — 46320 REMOVAL OF HEMORRHOID CLOT: CPT

## 2023-08-21 PROCEDURE — 99204 OFFICE O/P NEW MOD 45 MIN: CPT | Mod: 25

## 2023-08-21 RX ORDER — ALBUTEROL SULFATE 90 UG/1
108 (90 BASE) INHALANT RESPIRATORY (INHALATION)
Qty: 1 | Refills: 5 | Status: DISCONTINUED | COMMUNITY
Start: 2023-05-30 | End: 2023-08-21

## 2023-08-21 RX ORDER — AZITHROMYCIN 250 MG/1
250 TABLET, FILM COATED ORAL
Qty: 1 | Refills: 0 | Status: DISCONTINUED | COMMUNITY
Start: 2023-03-02 | End: 2023-08-21

## 2023-08-21 RX ORDER — FLUTICASONE PROPIONATE 50 UG/1
50 SPRAY, METERED NASAL DAILY
Qty: 3 | Refills: 1 | Status: DISCONTINUED | COMMUNITY
Start: 2023-03-02 | End: 2023-08-21

## 2023-08-21 RX ORDER — FLUTICASONE PROPIONATE 50 UG/1
50 SPRAY, METERED NASAL DAILY
Qty: 1 | Refills: 1 | Status: DISCONTINUED | COMMUNITY
Start: 2023-05-30 | End: 2023-08-21

## 2023-08-21 NOTE — PHYSICAL EXAM
[Normal Breath Sounds] : Normal breath sounds [Normal Heart Sounds] : normal heart sounds [Normal Rate and Rhythm] : normal rate and rhythm [No Rash or Lesion] : No rash or lesion [Alert] : alert [Oriented to Person] : oriented to person [Oriented to Place] : oriented to place [Oriented to Time] : oriented to time [Calm] : calm [Exam Deferred] : exam was deferred [Posterior] : posteriorly [Thrombosed] : that was thrombosed [Skin Tags] : residual hemorrhoidal skin tags were noted [Normal] : was normal [Excoriation] : no perianal excoriation [Fistula] : no fistulas [Wart] : no warts [Ulcer ___ cm] : no ulcers [Pilonidal Cyst] : no pilonidal cysts [Pilonidal Sinus] : no pilonidal sinus [Pilonidal Sinus Draining] : no pilonidal sinus drainage [Tender, Swollen] : nontender, non-swollen [de-identified] : soft, NT/ND, +BS [de-identified] : Thrombosed right posterior external Hemorrhoids [de-identified] : well nourished female [de-identified] : NC/AT [de-identified] : TRINY/+ROM [de-identified] : intact

## 2023-08-21 NOTE — ASSESSMENT
[FreeTextEntry1] : 37-year-old female status post excision of thrombosed hemorrhoid.   Plan: -Sitz bath 3 times daily -Proctosol 3 times daily -Metamucil -Followup as needed

## 2023-08-21 NOTE — HISTORY OF PRESENT ILLNESS
[FreeTextEntry1] : 37 year old female presents with thrombosed hemorrhoid. She is 4 weeks s/p . This past Thursday, she was having a lot of pain and then over the weekend she noticed rectal bleeding. The swelling has decreased since the hemorrhoid started draining but patient is still having a lot of discomfort in the area. Patient denies family history of colon/rectal cancer, IBD. No history of colonoscopy.

## 2023-08-21 NOTE — PROCEDURE
[FreeTextEntry1] : Excision of thrombosed hemorrhoid performed using a mixture of 1% lidocaine with epinephrine percent Marcaine. A 15 blade was used to excise the external hemorrhoids. Monsel solution was used for hemostasis.

## 2023-10-09 NOTE — OB RN DELIVERY SUMMARY - NS_DELIVERYATTENDING1_OBGYN_ALL_OB_FT
General Sunscreen Counseling: I recommended a broad-spectrum sunscreen with a SPF of 30 or higher.  I explained that SPF 30 sunscreens block approximately 97 percent of the sun's harmful rays.  Sunscreens should be applied at least 15 minutes prior to expected sun exposure and then every 2 hours after that as long as sun exposure continues. If swimming or exercising sunscreen should be reapplied every 45 minutes to an hour after getting wet or sweating.  One ounce, or the equivalent of a shot glass full of sunscreen, is adequate to protect the skin not covered by a bathing suit. I also recommended a lip balm with a sunscreen as well. Sun protective clothing can be used in lieu of sunscreen but must be worn the entire time you are exposed to the sun's rays. Detail Level: Detailed Noel Villegas MD

## 2023-10-12 ENCOUNTER — APPOINTMENT (OUTPATIENT)
Dept: FAMILY MEDICINE | Facility: CLINIC | Age: 38
End: 2023-10-12
Payer: COMMERCIAL

## 2023-10-12 ENCOUNTER — NON-APPOINTMENT (OUTPATIENT)
Age: 38
End: 2023-10-12

## 2023-10-12 VITALS
OXYGEN SATURATION: 98 % | WEIGHT: 210.38 LBS | HEART RATE: 84 BPM | HEIGHT: 65 IN | TEMPERATURE: 98.6 F | RESPIRATION RATE: 16 BRPM | DIASTOLIC BLOOD PRESSURE: 80 MMHG | BODY MASS INDEX: 35.05 KG/M2 | SYSTOLIC BLOOD PRESSURE: 123 MMHG

## 2023-10-12 DIAGNOSIS — Z23 ENCOUNTER FOR IMMUNIZATION: ICD-10-CM

## 2023-10-12 DIAGNOSIS — Z85.828 PERSONAL HISTORY OF OTHER MALIGNANT NEOPLASM OF SKIN: ICD-10-CM

## 2023-10-12 DIAGNOSIS — G60.0 HEREDITARY MOTOR AND SENSORY NEUROPATHY: ICD-10-CM

## 2023-10-12 DIAGNOSIS — Z83.3 FAMILY HISTORY OF DIABETES MELLITUS: ICD-10-CM

## 2023-10-12 DIAGNOSIS — Z00.00 ENCOUNTER FOR GENERAL ADULT MEDICAL EXAMINATION W/OUT ABNORMAL FINDINGS: ICD-10-CM

## 2023-10-12 PROCEDURE — G0008: CPT

## 2023-10-12 PROCEDURE — 99385 PREV VISIT NEW AGE 18-39: CPT | Mod: 25

## 2023-10-12 PROCEDURE — 90686 IIV4 VACC NO PRSV 0.5 ML IM: CPT

## 2023-10-12 RX ORDER — HYDROCORTISONE 25 MG/G
2.5 CREAM TOPICAL
Qty: 1 | Refills: 3 | Status: DISCONTINUED | COMMUNITY
Start: 2023-08-21 | End: 2023-10-12

## 2023-10-16 LAB
ALBUMIN SERPL ELPH-MCNC: 4.7 G/DL
ALP BLD-CCNC: 89 U/L
ALT SERPL-CCNC: 48 U/L
ANION GAP SERPL CALC-SCNC: 14 MMOL/L
APPEARANCE: CLEAR
AST SERPL-CCNC: 31 U/L
BASOPHILS # BLD AUTO: 0.05 K/UL
BASOPHILS NFR BLD AUTO: 0.8 %
BILIRUB SERPL-MCNC: 0.9 MG/DL
BILIRUBIN URINE: NEGATIVE
BLOOD URINE: NEGATIVE
BUN SERPL-MCNC: 10 MG/DL
CALCIUM SERPL-MCNC: 9.4 MG/DL
CHLORIDE SERPL-SCNC: 101 MMOL/L
CHOLEST SERPL-MCNC: 240 MG/DL
CO2 SERPL-SCNC: 22 MMOL/L
COLOR: YELLOW
CREAT SERPL-MCNC: 0.6 MG/DL
EGFR: 118 ML/MIN/1.73M2
EOSINOPHIL # BLD AUTO: 0.17 K/UL
EOSINOPHIL NFR BLD AUTO: 2.7 %
ESTIMATED AVERAGE GLUCOSE: 103 MG/DL
GLUCOSE QUALITATIVE U: NEGATIVE MG/DL
GLUCOSE SERPL-MCNC: 91 MG/DL
HBA1C MFR BLD HPLC: 5.2 %
HCT VFR BLD CALC: 43.1 %
HDLC SERPL-MCNC: 102 MG/DL
HGB BLD-MCNC: 13.8 G/DL
IMM GRANULOCYTES NFR BLD AUTO: 0.3 %
KETONES URINE: 15 MG/DL
LDLC SERPL CALC-MCNC: 129 MG/DL
LEUKOCYTE ESTERASE URINE: NEGATIVE
LYMPHOCYTES # BLD AUTO: 1.9 K/UL
LYMPHOCYTES NFR BLD AUTO: 29.7 %
MAN DIFF?: NORMAL
MCHC RBC-ENTMCNC: 30.9 PG
MCHC RBC-ENTMCNC: 32 GM/DL
MCV RBC AUTO: 96.6 FL
MONOCYTES # BLD AUTO: 0.34 K/UL
MONOCYTES NFR BLD AUTO: 5.3 %
NEUTROPHILS # BLD AUTO: 3.91 K/UL
NEUTROPHILS NFR BLD AUTO: 61.2 %
NITRITE URINE: NEGATIVE
NONHDLC SERPL-MCNC: 139 MG/DL
PH URINE: 6.5
PLATELET # BLD AUTO: 245 K/UL
POTASSIUM SERPL-SCNC: 4.4 MMOL/L
PROT SERPL-MCNC: 7 G/DL
PROTEIN URINE: NEGATIVE MG/DL
RBC # BLD: 4.46 M/UL
RBC # FLD: 13.3 %
SODIUM SERPL-SCNC: 136 MMOL/L
SPECIFIC GRAVITY URINE: 1.02
TRIGL SERPL-MCNC: 61 MG/DL
TSH SERPL-ACNC: 1.59 UIU/ML
UROBILINOGEN URINE: 0.2 MG/DL
WBC # FLD AUTO: 6.39 K/UL

## 2023-12-17 NOTE — DISCHARGE NOTE OB - CARE PROVIDER_API CALL
Male Noel Villegas.  Obstetrics and Gynecology  [address], [address]  [place] [address]  Phone: [phone]  Fax: [phone]  Follow Up Time:

## 2024-02-09 ENCOUNTER — APPOINTMENT (OUTPATIENT)
Dept: FAMILY MEDICINE | Facility: CLINIC | Age: 39
End: 2024-02-09
Payer: COMMERCIAL

## 2024-02-09 PROCEDURE — 99212 OFFICE O/P EST SF 10 MIN: CPT

## 2024-02-09 NOTE — HISTORY OF PRESENT ILLNESS
[Home] : at home, [unfilled] , at the time of the visit. [Medical Office: ([facility])___] : at the medical office located in  [Verbal consent obtained from patient] : the patient, [unfilled] [FreeTextEntry1] : fu wt loss medicine [de-identified] : 39 yo F PMHx obesity presenting for fu to discuss weight loss medicines. labwork reviewed. no fhx thyroid disease/cancer

## 2024-02-27 ENCOUNTER — TRANSCRIPTION ENCOUNTER (OUTPATIENT)
Age: 39
End: 2024-02-27

## 2024-03-05 ENCOUNTER — TRANSCRIPTION ENCOUNTER (OUTPATIENT)
Age: 39
End: 2024-03-05

## 2024-03-13 ENCOUNTER — TRANSCRIPTION ENCOUNTER (OUTPATIENT)
Age: 39
End: 2024-03-13

## 2024-03-14 ENCOUNTER — TRANSCRIPTION ENCOUNTER (OUTPATIENT)
Age: 39
End: 2024-03-14

## 2024-03-14 RX ORDER — TIRZEPATIDE 2.5 MG/.5ML
2.5 INJECTION, SOLUTION SUBCUTANEOUS
Qty: 1 | Refills: 0 | Status: DISCONTINUED | COMMUNITY
Start: 2024-02-09 | End: 2024-03-14

## 2024-03-19 ENCOUNTER — TRANSCRIPTION ENCOUNTER (OUTPATIENT)
Age: 39
End: 2024-03-19

## 2024-03-19 VITALS — WEIGHT: 210.38 LBS | HEIGHT: 65 IN | BODY MASS INDEX: 35.05 KG/M2

## 2024-03-21 ENCOUNTER — TRANSCRIPTION ENCOUNTER (OUTPATIENT)
Age: 39
End: 2024-03-21

## 2024-04-19 ENCOUNTER — APPOINTMENT (OUTPATIENT)
Dept: FAMILY MEDICINE | Facility: CLINIC | Age: 39
End: 2024-04-19
Payer: COMMERCIAL

## 2024-04-19 DIAGNOSIS — E66.9 OBESITY, UNSPECIFIED: ICD-10-CM

## 2024-04-19 PROCEDURE — 99212 OFFICE O/P EST SF 10 MIN: CPT

## 2024-04-26 RX ORDER — TIRZEPATIDE 2.5 MG/.5ML
2.5 INJECTION, SOLUTION SUBCUTANEOUS
Qty: 1 | Refills: 1 | Status: ACTIVE | COMMUNITY
Start: 2024-03-14

## 2024-08-16 NOTE — OB PROVIDER DELIVERY SUMMARY - NSPLACDELIVDETAIL_OBGYN_ALL_OB
Spontaneous

## 2024-11-01 NOTE — OB PROVIDER H&P - HISTORY OF PRESENT ILLNESS
Initiate Treatment: Triamcinolone starting in 2 weeks Render In Strict Bullet Format?: No Detail Level: Zone Pt is a 38yo  @ 40w3d here for elective IOL. Pt. denies LOF, VB, CTX. +FM. PNC uncomplicated GBS (-) EFW 3900g    OBHx: 2019 , FT, 8#5  GYNHx: denies ovarian cysts, fibroids, hx of abnormal pap or STDs  PMHx: denies  PSurgHx: denies  Allergies: PCN: rash  Meds: PNV  Social: No smoking, alcohol, or illicit drug use  Psych: No hx of anxiety or depression

## 2024-11-13 ENCOUNTER — APPOINTMENT (OUTPATIENT)
Dept: FAMILY MEDICINE | Facility: CLINIC | Age: 39
End: 2024-11-13
Payer: COMMERCIAL

## 2024-11-13 DIAGNOSIS — M62.830 MUSCLE SPASM OF BACK: ICD-10-CM

## 2024-11-13 PROCEDURE — 99213 OFFICE O/P EST LOW 20 MIN: CPT | Mod: 95

## 2024-11-13 RX ORDER — CYCLOBENZAPRINE HYDROCHLORIDE 10 MG/1
10 TABLET, FILM COATED ORAL
Qty: 30 | Refills: 0 | Status: ACTIVE | COMMUNITY
Start: 2024-11-13 | End: 1900-01-01

## 2025-01-13 ENCOUNTER — RX RENEWAL (OUTPATIENT)
Age: 40
End: 2025-01-13

## 2025-01-24 ENCOUNTER — APPOINTMENT (OUTPATIENT)
Dept: FAMILY MEDICINE | Facility: CLINIC | Age: 40
End: 2025-01-24